# Patient Record
(demographics unavailable — no encounter records)

---

## 2024-12-09 NOTE — HISTORY OF PRESENT ILLNESS
[Former] : Former [TextBox_13] : Ms. RODNEY is a 63 year old female with no reported past medical history.  Reviewed and confirmed that the patient meets screening eligibility criteria:  63 years old   Smoking Status: Former smoker  Number of pack(s) per day: 3/4 Number of years smoked: 30 Number of pack years smokin Quit year:   No symptoms of lung cancer, including new cough, change in cough, hemoptysis, and unintentional weight loss.  No personal history of lung cancer.  No lung cancer in a first degree relative.  History of exposure to 9-11. [YearQuit] : 2017 [PacksperDay] : .75 [PacksperYear] : 22 [N_Years] : 30 Estimated Blood Loss (Cc): minimal

## 2025-01-03 NOTE — HISTORY OF PRESENT ILLNESS
[Y] : Positive pregnancy history [Menarche Age: ____] : age at menarche was [unfilled] [FreeTextEntry1] : 63-year-old -0-1-2 presents for GYN evaluation.  Denies pain bleeding or discharge.  MRI of the breasts were BI-RADS 1.  Patient complains of stress incontinence. [PGHxTotal] : 3 [[facility] : [facility] [PGHxPremature] : 0 [PGHxAbortions] : 1 [[facility]] : 2 [PGHxABInduced] : 0 [PGHxABSpont] : 1 [PGHxEctopic] : 0 [PGHxMultBirths] : 0

## 2025-01-03 NOTE — PHYSICAL EXAM
[Chaperone Present] : A chaperone was present in the examining room during all aspects of the physical examination [86138] : A chaperone was present during the pelvic exam. [FreeTextEntry2] : Fanta [Appropriately responsive] : appropriately responsive [Alert] : alert [No Acute Distress] : no acute distress [No Lymphadenopathy] : no lymphadenopathy [Regular Rate Rhythm] : regular rate rhythm [No Murmurs] : no murmurs [Clear to Auscultation B/L] : clear to auscultation bilaterally [Soft] : soft [Non-tender] : non-tender [Non-distended] : non-distended [No HSM] : No HSM [No Lesions] : no lesions [No Mass] : no mass [Oriented x3] : oriented x3 [Examination Of The Breasts] : a normal appearance [No Masses] : no breast masses were palpable [Vulvar Atrophy] : vulvar atrophy [Labia Majora] : normal [Labia Minora] : normal [Atrophy] : atrophy [Dry Mucosa] : dry mucosa [Normal] : normal [Uterine Adnexae] : normal [Declined] : Patient declined rectal exam

## 2025-01-03 NOTE — DISCUSSION/SUMMARY
[FreeTextEntry1] : 63-year-old -0-1-2 presents with chief complaint of urinary stress incontinence.  No pain bleeding or discharge.  Physical exam shows evidence of atrophic changes.  Pap GC chlamydia sent and patient referred to urogynecology for evaluation and treatment.  Diet and exercise reviewed.  Return in 1 year for follow-up.

## 2025-01-03 NOTE — PHYSICAL EXAM
[Chaperone Present] : A chaperone was present in the examining room during all aspects of the physical examination [99202] : A chaperone was present during the pelvic exam. [FreeTextEntry2] : Fanta [Appropriately responsive] : appropriately responsive [Alert] : alert [No Acute Distress] : no acute distress [No Lymphadenopathy] : no lymphadenopathy [Regular Rate Rhythm] : regular rate rhythm [No Murmurs] : no murmurs [Clear to Auscultation B/L] : clear to auscultation bilaterally [Soft] : soft [Non-tender] : non-tender [Non-distended] : non-distended [No HSM] : No HSM [No Lesions] : no lesions [No Mass] : no mass [Oriented x3] : oriented x3 [Examination Of The Breasts] : a normal appearance [No Masses] : no breast masses were palpable [Vulvar Atrophy] : vulvar atrophy [Labia Majora] : normal [Labia Minora] : normal [Atrophy] : atrophy [Dry Mucosa] : dry mucosa [Normal] : normal [Uterine Adnexae] : normal [Declined] : Patient declined rectal exam

## 2025-01-03 NOTE — COUNSELING
[Nutrition/ Exercise/ Weight Management] : nutrition, exercise, weight management [Body Image] : body image [Vitamins/Supplements] : vitamins/supplements [Sunscreen] : sunscreen [Drugs] : drugs [Breast Self Exam] : breast self exam [Bladder Hygiene] : bladder hygiene

## 2025-01-03 NOTE — HISTORY OF PRESENT ILLNESS
[Y] : Positive pregnancy history [Menarche Age: ____] : age at menarche was [unfilled] [FreeTextEntry1] : 63-year-old -0-1-2 presents for GYN evaluation.  Denies pain bleeding or discharge.  MRI of the breasts were BI-RADS 1.  Patient complains of stress incontinence. [PGHxTotal] : 3 [[facility]] : 2 [PGHxPremature] : 0 [PGHxAbortions] : 1 [[facility]] : 2 [PGHxABInduced] : 0 [PGHxABSpont] : 1 [PGHxEctopic] : 0 [PGHxMultBirths] : 0

## 2025-01-15 NOTE — DISCUSSION/SUMMARY
[Please See Note in Chart and Documentation in Trial DB] : Please see note in chart and documentation in Trial DB. [FreeTextEntry3] : 63 y.o. F here for annual WTC monitoring visit, v3.   PCP: Dr. Danelle Vazquez   WTC Exposure: reviewed in trial DB  patient is a  that was exposed to [facility] dust and debris after 09 11 2001 Occupation at the time of 09 11 2001:  [employer]   MHx: depression/insomnia  SHx: R partial nephrectomy 1.7 cm renal mass  - path c/w benign angiolipoma  Meds: trazodone 200mg qhs  All: NKDA FHx: paternal grandmother with breast cancer age 39 yo, sister with +BRCA gene  Social: quit on and off for years, last smoked 2017, 30 Years 3/4 ppd   ROS: reviewed iamq with pt   PE & VS: see trial DB   Results:  LDCT Dec 96845: 2 mm left upper lobe nodule, unchanged Spirometry: not enough acceptable maneuvers, FEV1/FVC: 0.690  A/P:  -cbc, cmp, lipids, UA  -LDCT Dec 2024 findings as above  -spirometry reviewed  -pt had breast MRI due to FHx Dec 2024 which was normal, advised that MRI alone should not be used for screening purposes and she should have a mammo as well, will refer  -pap smear Dec 2024 UTD  -colonoscopy 2023 but due back now due to incomplete prep, she follows with her own GYN  -flu shot today

## 2025-01-15 NOTE — HEALTH RISK ASSESSMENT
[Patient reported mammogram was normal] : Patient reported mammogram was normal [Patient reported PAP Smear was normal] : Patient reported PAP Smear was normal [MammogramDate] : 03/2022 [PapSmearDate] : 12/2024 [ColonoscopyDate] : 10/2023 [ColonoscopyComments] : incomplete prep, polypectomy, due back now

## 2025-01-15 NOTE — DISCUSSION/SUMMARY
[Please See Note in Chart and Documentation in Trial DB] : Please see note in chart and documentation in Trial DB. [FreeTextEntry3] : 63 y.o. F here for annual WTC monitoring visit, v3.   PCP: Dr. Danelle Vazquez   WTC Exposure: reviewed in trial DB  patient is a  that was exposed to [facility] dust and debris after 09 11 2001 Occupation at the time of 09 11 2001:  [employer]   MHx: depression/insomnia  SHx: R partial nephrectomy 1.7 cm renal mass  - path c/w benign angiolipoma  Meds: trazodone 200mg qhs  All: NKDA FHx: paternal grandmother with breast cancer age 39 yo, sister with +BRCA gene  Social: quit on and off for years, last smoked 2017, 30 Years 3/4 ppd   ROS: reviewed iamq with pt   PE & VS: see trial DB   Results:  LDCT Dec 13202: 2 mm left upper lobe nodule, unchanged Spirometry: not enough acceptable maneuvers, FEV1/FVC: 0.690  A/P:  -cbc, cmp, lipids, UA  -LDCT Dec 2024 findings as above  -spirometry reviewed  -pt had breast MRI due to FHx Dec 2024 which was normal, advised that MRI alone should not be used for screening purposes and she should have a mammo as well, will refer  -pap smear Dec 2024 UTD  -colonoscopy 2023 but due back now due to incomplete prep, she follows with her own GYN  -flu shot today